# Patient Record
Sex: MALE | Race: WHITE | NOT HISPANIC OR LATINO | URBAN - METROPOLITAN AREA
[De-identification: names, ages, dates, MRNs, and addresses within clinical notes are randomized per-mention and may not be internally consistent; named-entity substitution may affect disease eponyms.]

---

## 2017-06-23 ENCOUNTER — COMMUNICATION - HEALTHEAST (OUTPATIENT)
Dept: SCHEDULING | Facility: CLINIC | Age: 42
End: 2017-06-23

## 2017-06-23 DIAGNOSIS — A60.00 GENITAL HERPES, UNSPECIFIED: ICD-10-CM

## 2017-06-23 RX ORDER — VALACYCLOVIR HYDROCHLORIDE 500 MG/1
TABLET, FILM COATED ORAL
Qty: 30 TABLET | Refills: 3 | Status: SHIPPED | OUTPATIENT
Start: 2017-06-23

## 2018-01-29 ENCOUNTER — OFFICE VISIT - HEALTHEAST (OUTPATIENT)
Dept: FAMILY MEDICINE | Facility: CLINIC | Age: 43
End: 2018-01-29

## 2018-01-29 DIAGNOSIS — G44.209 TENSION HEADACHE: ICD-10-CM

## 2021-05-31 VITALS — WEIGHT: 208.1 LBS | BODY MASS INDEX: 29.86 KG/M2

## 2021-06-26 NOTE — PROGRESS NOTES
Progress Notes by Linda Arias PA-C at 1/29/2018  9:10 AM     Author: Linda Arias PA-C Service: -- Author Type: Physician Assistant    Filed: 1/29/2018 12:10 PM Encounter Date: 1/29/2018 Status: Signed    : Linda Arias PA-C (Physician Assistant)       Subjective:      Patient ID: Toro Snider is a 42 y.o. male.    Chief Complaint:    HPI Toro Snider is a 42 y.o. male who presents today complaining of headache ×1 week.  Patient states that he has never had a HA last this long. Denies hx of migraine disorder. The location of pain varries. Today it was bitemportal. Now it is posterior. It is usually bilateral. The pain was very intense 1 week ago that it made him feel nauseated. He has been taking Excedrin and Tylenol. The Excedrin seems to help more than the Tylenol. His last dose of Excedrin was yesterday. His HA is currently 4/10 and a 8-9/10 last week. Patient denies any changes in stress. Patient denies any hx of high blood pressure. The HA seems to gradually get worse throughout the day if he doesn't take any medication.       Family History   Problem Relation Age of Onset   ? No Medical Problems Mother    ? Diabetes Father    ? Heart disease Father    ? Heart attack Father    ? No Medical Problems Sister    ? No Medical Problems Brother    ? Heart disease Maternal Grandmother    ? Heart attack Maternal Grandmother    ? Diabetes Paternal Grandmother    ? Heart attack Paternal Grandmother    ? Heart disease Paternal Grandmother    ? Diabetes Paternal Grandfather    ? Stroke Paternal Grandfather    ? No Medical Problems Brother        Social History   Substance Use Topics   ? Smoking status: Never Smoker   ? Smokeless tobacco: Current User     Types: Chew   ? Alcohol use None       Review of Systems   Constitutional: Negative for fever.        Negative for body aches   HENT: Negative for congestion, ear pain, sinus pain, sinus pressure and sore throat.    Eyes: Negative for photophobia and  visual disturbance.   Gastrointestinal: Negative for nausea and vomiting.   Neurological: Positive for headaches. Negative for dizziness, syncope, weakness and light-headedness.       Objective:     /60  Pulse 62  Temp 98.3  F (36.8  C) (Oral)   Wt 208 lb 1.6 oz (94.4 kg)  SpO2 99%  BMI 29.86 kg/m2    Physical Exam   Constitutional: Vital signs are normal. He appears well-developed and well-nourished. No distress.   HENT:   Head: Normocephalic and atraumatic.   Right Ear: External ear normal.   Left Ear: External ear normal.   Patients scalp was NTTP over occipital or temporal regions.    Eyes: Conjunctivae and EOM are normal. Pupils are equal, round, and reactive to light. Right eye exhibits no discharge. Left eye exhibits no discharge.   Fundoscopic exam:       The right eye shows no arteriolar narrowing, no AV nicking, no exudate, no hemorrhage and no papilledema. The right eye shows red reflex. The right eye shows no venous pulsations.        The left eye shows no arteriolar narrowing, no AV nicking, no exudate, no hemorrhage and no papilledema. The left eye shows red reflex. The left eye shows no venous pulsations.   Patient wears glasses   Neck: Normal range of motion. Neck supple. No spinous process tenderness and no muscular tenderness present. No rigidity.   Cardiovascular: Normal rate, regular rhythm and normal heart sounds.  Exam reveals no gallop and no friction rub.    No murmur heard.  Pulmonary/Chest: Effort normal and breath sounds normal. No respiratory distress. He has no wheezes. He has no rales.   Lymphadenopathy:     He has no cervical adenopathy.   Skin: He is not diaphoretic.   Psychiatric: He has a normal mood and affect. His behavior is normal. Judgment and thought content normal.   Nursing note and vitals reviewed.    Clinical Decision Making:  Distribution of headache is most consistent with tension type headache.  No history of migraine disorder known.  Patient has not had his  eye prescription checked in about a year, but he does not have any reported change in vision that he has noticed.  Physical exam is normal today. Patient received a Toradol injection without complication today. At the end of the encounter, I discussed diagnosis and medications. Discussed red flags for immediate return to clinic/ER, as well as indications for follow up if no improvement. Patient understood and agreed to plan. Patient was stable for discharge.      Assessment:     Procedures    1. Tension headache  ketorolac injection 30 mg (TORADOL)         Patient Instructions   1.  Increase fluids and rest.  Try to make sure you are getting at least 8 hours of sleep per night.  2.  Continue taking Excedrin as needed for pain control.  3.  Follow-up with your primary care provider if you continue having symptoms for an additional week.  Also, follow-up with your ophthalmologist for rechecking vision prescription.  4.  Follow self-care directions below.  Seek emergency medical attention for weakness, confusion, or the worst headache of your life that is not improving with Excedrin.  Managing Tension-type Headache Symptoms  A tension-type headache can develop slowly. Being aware of the symptoms helps you recognize a headache early. Then you can act to reduce pain and relieve tension. Methods for relieving your symptoms include self-care and medicine.    Tension-type symptoms  The earlier you recognize the symptoms of a tension-type headache, the easier it is to treat. Tension-type headaches may:    Begin with fatigue, tension, or pain in the neck and shoulders    Feel like a band around the head    Be concentrated in the temple, the back of the head, behind the eyes, or in the face    Come and go, or last for days, weeks, or even longer    Involve referred pain -- this means that the area that hurts may not be where the problem begins  Self-care during a tension-type headache  When you have a tension-type headache,  there are things you can do to relax, loosen muscles, and reduce the pain:    Brush your scalp lightly with a soft hairbrush.    Give yourself a massage. Knead the muscles running from your shoulders up the back of your skull. Or ask a friend to gently massage your neck and shoulders.    Use an ice pack. Wrap a thin cloth around a cold pack, a cold can of soda, or a bag of frozen vegetables. Apply this directly to the place where you feel pain (such as your neck or temples).    Use moist heat to relax your muscles. Take a warm shower or bath. Or drape a warm, moist towel around your neck and shoulders.  Relieving pain and tension  Over-the-counter or prescription medicine can help relieve pain. Another way to reduce your pain is to use relaxation techniques to loosen tight muscles.  Medicine  Medicine used for tension-type headaches include the following:    NSAIDs (nonsteroidal anti-inflammatories), such as aspirin and ibuprofen, relieve inflammation and help block pain signals.    Acetaminophen treats pain, and some formulations contain caffeine.     Muscle relaxants can reduce painful muscle contractions.  Taking medicine safely  Be aware that:    Taking analgesics (pain relievers) or drinking too much coffee may lead to rebound headaches (frequent or severe headaches that can happen if you miss a dose of medication), so take pain medications only as needed. If you think you have these headaches, contact your health care provider.    Taking too much medication can cause sleep problems or stomach upset. Some over-the-counter headache medications may contain caffeine. These may disrupt sleep and worsen pain.  Relaxation techniques  A , class, book, or tape may help you learn these techniques. One or more of these methods may work for you:    Deep breathing. Slow, calm, deep breathing can help you relax. Breathe in for a count of 5 or more. Then slowly let the breath out.    Visualization. Imagining a  peaceful, secure scene can give you a sense of control over your body and surroundings.    Progressive relaxation. This is done by tightening and then releasing muscle groups. Start at the top of your head and work your way down your body. Tighten each muscle group for 5 to 10 seconds. Then release the muscle group for the same amount of time.    Biofeedback. This is a type of training in which you learn to control certain physical functions and responses. This helps you learn to reduce muscle tension.